# Patient Record
Sex: MALE | Race: WHITE | NOT HISPANIC OR LATINO | Employment: STUDENT | URBAN - METROPOLITAN AREA
[De-identification: names, ages, dates, MRNs, and addresses within clinical notes are randomized per-mention and may not be internally consistent; named-entity substitution may affect disease eponyms.]

---

## 2024-07-08 ENCOUNTER — OFFICE VISIT (OUTPATIENT)
Dept: PRIMARY CARE | Facility: CLINIC | Age: 19
End: 2024-07-08
Payer: COMMERCIAL

## 2024-07-08 VITALS
SYSTOLIC BLOOD PRESSURE: 114 MMHG | BODY MASS INDEX: 18.62 KG/M2 | HEIGHT: 71 IN | OXYGEN SATURATION: 97 % | DIASTOLIC BLOOD PRESSURE: 72 MMHG | HEART RATE: 100 BPM | RESPIRATION RATE: 12 BRPM | WEIGHT: 133 LBS

## 2024-07-08 DIAGNOSIS — S39.011A STRAIN OF ABDOMINAL MUSCLE, INITIAL ENCOUNTER: Primary | ICD-10-CM

## 2024-07-08 PROCEDURE — 1036F TOBACCO NON-USER: CPT | Performed by: FAMILY MEDICINE

## 2024-07-08 PROCEDURE — 99213 OFFICE O/P EST LOW 20 MIN: CPT | Performed by: FAMILY MEDICINE

## 2024-07-08 NOTE — PROGRESS NOTES
"Subjective   Patient ID: Theo Rosa is a 18 y.o. male who presents for Abdominal Pain.    HPI     Review of Systems    Objective   /72   Pulse 100   Resp 12   Ht 1.803 m (5' 11\")   Wt 60.3 kg (133 lb)   SpO2 97%   BMI 18.55 kg/m²     Physical Exam    Assessment/Plan          "

## 2024-07-08 NOTE — PROGRESS NOTES
"Subjective   Patient ID: Theo Rosa is a 18 y.o. male who presents for Abdominal Pain.    HPI  Reports midline low abdominal pain 2 days ago.  Onset after lifting items at work--NOT [employer] CLAIM.  Described as tugging, strain.  Worse w/running, jumping.  Improved w/sitting upright, Advil.  Rated at max 6/10.  Rated on average 3/10.  Currently denies pain.  Pain completely resolved yesterday and has not returned.    Review of Systems  No other complaints.     Objective   /72   Pulse 100   Resp 12   Ht 1.803 m (5' 11\")   Wt 60.3 kg (133 lb)   SpO2 97%   BMI 18.55 kg/m²     Physical Exam  Constitutional:       General: He is not in acute distress.  Abdominal:      General: Bowel sounds are normal. There is no distension.      Palpations: Abdomen is soft. There is no mass.      Tenderness: There is no abdominal tenderness. There is no guarding or rebound.      Hernia: No hernia is present.   Neurological:      Mental Status: He is oriented to person, place, and time.   Psychiatric:         Mood and Affect: Mood normal.         Behavior: Behavior normal.     Assessment/Plan   Diagnoses and all orders for this visit:  Strain of abdominal muscle, initial encounter    OTC analgesics as needed if symptoms return.    Schedule annual wellness visit.   "

## 2024-08-05 ENCOUNTER — APPOINTMENT (OUTPATIENT)
Dept: PRIMARY CARE | Facility: CLINIC | Age: 19
End: 2024-08-05
Payer: COMMERCIAL

## 2024-08-05 ENCOUNTER — LAB (OUTPATIENT)
Dept: LAB | Facility: LAB | Age: 19
End: 2024-08-05
Payer: COMMERCIAL

## 2024-08-05 VITALS
RESPIRATION RATE: 16 BRPM | HEART RATE: 77 BPM | DIASTOLIC BLOOD PRESSURE: 73 MMHG | HEIGHT: 72 IN | WEIGHT: 134 LBS | OXYGEN SATURATION: 98 % | BODY MASS INDEX: 18.15 KG/M2 | SYSTOLIC BLOOD PRESSURE: 112 MMHG

## 2024-08-05 DIAGNOSIS — Z00.00 ANNUAL PHYSICAL EXAM: ICD-10-CM

## 2024-08-05 DIAGNOSIS — Z11.59 NEED FOR HEPATITIS C SCREENING TEST: ICD-10-CM

## 2024-08-05 DIAGNOSIS — Z00.00 ANNUAL PHYSICAL EXAM: Primary | ICD-10-CM

## 2024-08-05 LAB
ALT SERPL W P-5'-P-CCNC: 16 U/L (ref 10–52)
ANION GAP SERPL CALC-SCNC: 12 MMOL/L (ref 10–20)
AST SERPL W P-5'-P-CCNC: 15 U/L (ref 9–39)
BUN SERPL-MCNC: 22 MG/DL (ref 6–23)
CALCIUM SERPL-MCNC: 10 MG/DL (ref 8.6–10.6)
CHLORIDE SERPL-SCNC: 103 MMOL/L (ref 98–107)
CHOLEST SERPL-MCNC: 154 MG/DL (ref 0–199)
CHOLESTEROL/HDL RATIO: 3.1
CO2 SERPL-SCNC: 30 MMOL/L (ref 21–32)
CREAT SERPL-MCNC: 1.12 MG/DL (ref 0.5–1.3)
EGFRCR SERPLBLD CKD-EPI 2021: >90 ML/MIN/1.73M*2
ERYTHROCYTE [DISTWIDTH] IN BLOOD BY AUTOMATED COUNT: 11.8 % (ref 11.5–14.5)
GLUCOSE SERPL-MCNC: 96 MG/DL (ref 74–99)
HCT VFR BLD AUTO: 45.3 % (ref 41–52)
HCV AB SER QL: NONREACTIVE
HDLC SERPL-MCNC: 49.8 MG/DL
HGB BLD-MCNC: 15.4 G/DL (ref 13.5–17.5)
LDLC SERPL CALC-MCNC: 77 MG/DL
MCH RBC QN AUTO: 29.6 PG (ref 26–34)
MCHC RBC AUTO-ENTMCNC: 34 G/DL (ref 32–36)
MCV RBC AUTO: 87 FL (ref 80–100)
NON HDL CHOLESTEROL: 104 MG/DL (ref 0–119)
NRBC BLD-RTO: 0 /100 WBCS (ref 0–0)
PLATELET # BLD AUTO: 234 X10*3/UL (ref 150–450)
POTASSIUM SERPL-SCNC: 4.3 MMOL/L (ref 3.5–5.3)
RBC # BLD AUTO: 5.21 X10*6/UL (ref 4.5–5.9)
SODIUM SERPL-SCNC: 141 MMOL/L (ref 136–145)
TRIGL SERPL-MCNC: 135 MG/DL (ref 0–149)
TSH SERPL-ACNC: 2.21 MIU/L (ref 0.44–3.98)
VLDL: 27 MG/DL (ref 0–40)
WBC # BLD AUTO: 5.5 X10*3/UL (ref 4.4–11.3)

## 2024-08-05 PROCEDURE — 85027 COMPLETE CBC AUTOMATED: CPT

## 2024-08-05 PROCEDURE — 84443 ASSAY THYROID STIM HORMONE: CPT

## 2024-08-05 PROCEDURE — 80061 LIPID PANEL: CPT

## 2024-08-05 PROCEDURE — 36415 COLL VENOUS BLD VENIPUNCTURE: CPT

## 2024-08-05 PROCEDURE — 86803 HEPATITIS C AB TEST: CPT

## 2024-08-05 PROCEDURE — 80048 BASIC METABOLIC PNL TOTAL CA: CPT

## 2024-08-05 PROCEDURE — 1036F TOBACCO NON-USER: CPT | Performed by: FAMILY MEDICINE

## 2024-08-05 PROCEDURE — 84460 ALANINE AMINO (ALT) (SGPT): CPT

## 2024-08-05 PROCEDURE — 3008F BODY MASS INDEX DOCD: CPT | Performed by: FAMILY MEDICINE

## 2024-08-05 PROCEDURE — 99395 PREV VISIT EST AGE 18-39: CPT | Performed by: FAMILY MEDICINE

## 2024-08-05 PROCEDURE — 84450 TRANSFERASE (AST) (SGOT): CPT

## 2024-08-05 ASSESSMENT — PATIENT HEALTH QUESTIONNAIRE - PHQ9
2. FEELING DOWN, DEPRESSED OR HOPELESS: NOT AT ALL
SUM OF ALL RESPONSES TO PHQ9 QUESTIONS 1 AND 2: 0
1. LITTLE INTEREST OR PLEASURE IN DOING THINGS: NOT AT ALL

## 2024-08-05 NOTE — PROGRESS NOTES
"Subjective   Patient ID: Theo Rosa is a 18 y.o. male who presents for Annual Exam.    HPI   Patient's health is described as good.  Regular dental visits: Yes.  Dental hygiene (brushing/flossing) regularly performed: Yes.  Corrective lenses: No.  Vision problems: No.  Last eye exam within 1 year: No.  Hearing loss: No.  Requests audiology referral: No.  Immunizations up to date: Yes.  Healthy diet: No.  Regular exercise: Yes.  Trying to lose weight: No.  Requests nutrition/weight loss referral: No.  Sexually active: No.  Using contraception: N/A.  Requests STD screening: No.  Colon cancer screening up to date: N/A.  Lung cancer screening up to date: N/A.  Hepatitis C screening up to date: No.    Reviewed/Updated Active problem list, PMH, PSH, FH, SH, Meds, Allergies.     Review of Systems  No other complaints.     Objective   /73   Pulse 77   Resp 16   Ht 1.816 m (5' 11.5\")   Wt 60.8 kg (134 lb)   SpO2 98%   BMI 18.43 kg/m²     Physical Exam  Constitutional:       General: He is not in acute distress.     Appearance: He is underweight.   HENT:      Head: Normocephalic.      Right Ear: Tympanic membrane normal.      Left Ear: Tympanic membrane normal.      Mouth/Throat:      Pharynx: Oropharynx is clear. No oropharyngeal exudate or posterior oropharyngeal erythema.   Eyes:      Extraocular Movements: Extraocular movements intact.      Conjunctiva/sclera: Conjunctivae normal.      Pupils: Pupils are equal, round, and reactive to light.   Neck:      Thyroid: No thyromegaly.      Vascular: No carotid bruit.   Cardiovascular:      Rate and Rhythm: Regular rhythm.      Heart sounds: Normal heart sounds. No murmur heard.     No friction rub. No gallop.   Pulmonary:      Effort: Pulmonary effort is normal.      Breath sounds: Normal breath sounds. No wheezing, rhonchi or rales.   Abdominal:      General: Bowel sounds are normal. There is no distension.      Palpations: Abdomen is soft. There is no mass. "      Tenderness: There is no abdominal tenderness. There is no guarding or rebound.   Lymphadenopathy:      Cervical: No cervical adenopathy.   Skin:     Coloration: Skin is not jaundiced or pale.   Neurological:      General: No focal deficit present.      Mental Status: He is oriented to person, place, and time.   Psychiatric:         Mood and Affect: Mood normal.         Behavior: Behavior normal.     Assessment/Plan   Diagnoses and all orders for this visit:  Annual physical exam  -     CBC; Future  -     Basic Metabolic Panel; Future  -     Lipid Panel; Future  -     Aspartate Aminotransferase; Future  -     Alanine Aminotransferase; Future  -     TSH with reflex to Free T4 if abnormal; Future  Need for hepatitis C screening test  -     Hepatitis C Antibody; Future    Fasting labs.    F/U 1 year: Annual wellness visit.

## 2024-08-05 NOTE — PATIENT INSTRUCTIONS
Fasting labs.    F/U 1 year: Annual wellness visit.    Lab services: [address]  Hours: M-F 6:30a-6p, Sat 8a-12p  Phone: [phone], Option 1

## 2025-03-19 ENCOUNTER — APPOINTMENT (OUTPATIENT)
Dept: PRIMARY CARE | Facility: CLINIC | Age: 20
End: 2025-03-19
Payer: COMMERCIAL

## 2025-03-19 VITALS
SYSTOLIC BLOOD PRESSURE: 124 MMHG | HEART RATE: 76 BPM | OXYGEN SATURATION: 97 % | DIASTOLIC BLOOD PRESSURE: 84 MMHG | BODY MASS INDEX: 18.28 KG/M2 | WEIGHT: 135 LBS | HEIGHT: 72 IN | RESPIRATION RATE: 12 BRPM

## 2025-03-19 DIAGNOSIS — Q55.61 PENILE CURVE: Primary | ICD-10-CM

## 2025-03-19 DIAGNOSIS — R21 RASH: ICD-10-CM

## 2025-03-19 PROCEDURE — 1036F TOBACCO NON-USER: CPT | Performed by: FAMILY MEDICINE

## 2025-03-19 PROCEDURE — 3008F BODY MASS INDEX DOCD: CPT | Performed by: FAMILY MEDICINE

## 2025-03-19 PROCEDURE — 99214 OFFICE O/P EST MOD 30 MIN: CPT | Performed by: FAMILY MEDICINE

## 2025-03-19 NOTE — PROGRESS NOTES
"Subjective   Patient ID: Theo Rosa is a 19 y.o. male who presents for Penile curve.    HPI     Review of Systems    Objective   /84   Pulse 76   Resp 12   Ht 1.816 m (5' 11.5\")   Wt 61.2 kg (135 lb)   SpO2 97%   BMI 18.57 kg/m²     Physical Exam    Assessment/Plan          "

## 2025-03-19 NOTE — PROGRESS NOTES
"Subjective   Patient ID: Theo Rosa is a 19 y.o. male who presents for No chief complaint on file..    HPI     Review of Systems    Objective   /84   Pulse 76   Resp 12   Ht 1.816 m (5' 11.5\")   Wt 61.2 kg (135 lb)   SpO2 97%   BMI 18.57 kg/m²     Physical Exam    Assessment/Plan   {Assess/PlanSmartLinks:76719}       "

## 2025-03-19 NOTE — PROGRESS NOTES
"Subjective   Patient ID: Theo Rosa is a 19 y.o. male who presents for Penile curve.    HPI   Downward curve of penis x several years, only occurs when erect.  States he used to rub his penis against the table and pull the tip downward against the end of the table when he was younger.  No pain, no erection issues, no urinary complaints.  Saw pediatric urology at age 16, was told it was likely congenital and not Peyronie's.  Was told to follow up w/adult urology if any issues in the future.  Patient requests urology referral.    Rash on chest > 2 wks.  Thinks it started after shaving his chest then got worse.  No fevers or other topical exposures.    Review of Systems  No other complaints.     Objective   /84   Pulse 76   Resp 12   Ht 1.816 m (5' 11.5\")   Wt 61.2 kg (135 lb)   SpO2 97%   BMI 18.57 kg/m²     Physical Exam  Constitutional:       General: He is not in acute distress.     Appearance: He is normal weight.   Genitourinary:     Comments: Normal flaccid penis  Skin:     Findings: Rash (chest, does not look infected) present.   Neurological:      Mental Status: He is oriented to person, place, and time.   Psychiatric:         Mood and Affect: Mood normal.         Behavior: Behavior normal.     Assessment/Plan   Diagnoses and all orders for this visit:  Penile curve  -     Referral to Urology; Future  Rash  -     Referral to Dermatology    Referred to urology and dermatology as discussed.    F/U 5 months: Annual wellness visit.  "

## 2025-06-09 ENCOUNTER — APPOINTMENT (OUTPATIENT)
Dept: PRIMARY CARE | Facility: CLINIC | Age: 20
End: 2025-06-09
Payer: COMMERCIAL

## 2025-06-09 ENCOUNTER — OFFICE VISIT (OUTPATIENT)
Dept: PRIMARY CARE | Facility: CLINIC | Age: 20
End: 2025-06-09
Payer: COMMERCIAL

## 2025-06-09 VITALS
DIASTOLIC BLOOD PRESSURE: 82 MMHG | SYSTOLIC BLOOD PRESSURE: 118 MMHG | HEART RATE: 79 BPM | WEIGHT: 139 LBS | RESPIRATION RATE: 16 BRPM | OXYGEN SATURATION: 98 % | BODY MASS INDEX: 19.12 KG/M2

## 2025-06-09 DIAGNOSIS — Z30.09 VASECTOMY EVALUATION: ICD-10-CM

## 2025-06-09 DIAGNOSIS — H61.23 BILATERAL IMPACTED CERUMEN: Primary | ICD-10-CM

## 2025-06-09 DIAGNOSIS — B07.9 WART OF HAND: ICD-10-CM

## 2025-06-09 PROCEDURE — 99213 OFFICE O/P EST LOW 20 MIN: CPT | Performed by: FAMILY MEDICINE

## 2025-06-09 NOTE — PATIENT INSTRUCTIONS
Bilateral ears successfully  irrigated    Referred to urology as requested.    Try otc topical wart removal products (ie Compound W, etc).  Call or send message through [employer] if these symptoms worsen or fail to improve as anticipated.      F/U 2 months as previously advised: Annual wellness visit.

## 2025-06-09 NOTE — PROGRESS NOTES
Subjective   Patient ID: Theo Rosa is a 19 y.o. male who presents for Ear Fullness (Left ) and Follow-up (Discuss referral for vasectomy ).    HPI   Left ear fullness x 3 wks.  Feels like a lot of wax in his ear.  Tries otc ear gtt and irrigation.  No pain.  No n/v.  No fevers.  No dizziness.    Wants urology referral for vasectomy.  Does not have any children.    Review of Systems  Wart on right hand x few months.  Has not tried any otc products.    Objective   /82   Pulse 79   Resp 16   Wt 63 kg (139 lb)   SpO2 98%   BMI 19.12 kg/m²     Physical Exam  Constitutional:       General: He is not in acute distress.     Appearance: He is normal weight.   HENT:      Right Ear: There is impacted cerumen.      Left Ear: There is impacted cerumen.   Skin:     Comments: Wart dorsal right hand between 2nd and 3rd fingers.   Neurological:      Mental Status: He is oriented to person, place, and time.   Psychiatric:         Mood and Affect: Mood normal.         Behavior: Behavior normal.     Procedure (bilateral ear irrigation):   Cerumen was removed using gentle irrigation (performed by M.A.).   Tympanic membranes are intact following the procedure.  Auditory canals are normal.  No complications.  Assessment/Plan   Diagnoses and all orders for this visit:  Bilateral impacted cerumen  -     Ear Cerumen Removal; Future  Vasectomy evaluation  -     Referral to Urology; Future  Wart of hand    Bilateral ears successfully  irrigated    Referred to urology as requested.    Try otc topical wart removal products (ie Compound W, etc).  Call or send message through [employer] if these symptoms worsen or fail to improve as anticipated.      F/U 2 months as previously advised: Annual wellness visit.

## 2025-07-11 ENCOUNTER — APPOINTMENT (OUTPATIENT)
Dept: UROLOGY | Facility: CLINIC | Age: 20
End: 2025-07-11
Payer: COMMERCIAL

## 2025-07-11 DIAGNOSIS — Z30.09 VASECTOMY EVALUATION: ICD-10-CM

## 2025-07-11 PROCEDURE — 99203 OFFICE O/P NEW LOW 30 MIN: CPT | Performed by: UROLOGY

## 2025-07-11 PROCEDURE — 1036F TOBACCO NON-USER: CPT | Performed by: UROLOGY

## 2025-07-11 NOTE — PATIENT INSTRUCTIONS
Impression  Family planning  Peyronie's disease  Delayed ejaculation  Sperm preservation    Plan  Consult Dr. QUINTEROS or Dr. Byrne or Tia consult for multiple sex related issues

## 2025-07-11 NOTE — PROGRESS NOTES
07/11/2025  19-year-old gentleman presents with vasectomy consult, multiple questions about sperm storage, delay ejaculation, and curvature of the penis.    Possible childhood penile injury    Exam: Circumcised, normal penis no scar tissue; normal testicles bilaterally no nodules    We discussed the vasectomy, delayed ejaculation and the curvature of the penis Peyronie's disease etc.  All the questions were answered, the patient expressed understanding and agreed to the plan.    Impression  Family planning  Peyronie's disease  Delayed ejaculation  Sperm preservation    Plan  Consult Dr. QUINTEROS or Dr. Byrne or Tia consult for multiple sex related issues    Chief Complaint   Patient presents with    Sterilization     Pt is here today for a vasectomy consult. He has 0 children and he does not have a current form of birth control.         Physical Exam     TODAYS LAB RESULTS:      ASSESSMENT&PLAN:      IMPRESSIONS:

## 2025-09-01 ENCOUNTER — OFFICE VISIT (OUTPATIENT)
Dept: URGENT CARE | Age: 20
End: 2025-09-01
Payer: COMMERCIAL

## 2025-09-01 VITALS
OXYGEN SATURATION: 95 % | SYSTOLIC BLOOD PRESSURE: 105 MMHG | HEART RATE: 88 BPM | TEMPERATURE: 99.3 F | DIASTOLIC BLOOD PRESSURE: 68 MMHG | RESPIRATION RATE: 16 BRPM

## 2025-09-01 DIAGNOSIS — J02.9 ACUTE PHARYNGITIS, UNSPECIFIED ETIOLOGY: Primary | ICD-10-CM

## 2025-09-01 DIAGNOSIS — J02.9 SORE THROAT: ICD-10-CM

## 2025-09-01 LAB
POC HUMAN RHINOVIRUS PCR: NEGATIVE
POC INFLUENZA A VIRUS PCR: NEGATIVE
POC INFLUENZA B VIRUS PCR: NEGATIVE
POC RESPIRATORY SYNCYTIAL VIRUS PCR: NEGATIVE
POC SARS-COV-2 AG BINAX: NORMAL
POC STREPTOCOCCUS PYOGENES (GROUP A STREP) PCR: NEGATIVE

## 2025-09-01 RX ORDER — PREDNISONE 20 MG/1
40 TABLET ORAL DAILY
Qty: 10 TABLET | Refills: 0 | Status: SHIPPED | OUTPATIENT
Start: 2025-09-01 | End: 2025-09-06

## 2025-09-01 RX ORDER — AZITHROMYCIN 250 MG/1
TABLET, FILM COATED ORAL
Qty: 6 TABLET | Refills: 0 | Status: SHIPPED | OUTPATIENT
Start: 2025-09-01 | End: 2025-09-06

## 2025-09-01 ASSESSMENT — ENCOUNTER SYMPTOMS
CHILLS: 0
FEVER: 0
WHEEZING: 0
TROUBLE SWALLOWING: 0
NAUSEA: 0
CHEST TIGHTNESS: 0
SHORTNESS OF BREATH: 0
COUGH: 0
DIARRHEA: 0
PALPITATIONS: 0
RHINORRHEA: 0
SORE THROAT: 1
CONSTIPATION: 0